# Patient Record
Sex: FEMALE | Race: WHITE | NOT HISPANIC OR LATINO | Employment: UNEMPLOYED | URBAN - METROPOLITAN AREA
[De-identification: names, ages, dates, MRNs, and addresses within clinical notes are randomized per-mention and may not be internally consistent; named-entity substitution may affect disease eponyms.]

---
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
## 2022-02-11 VITALS
SYSTOLIC BLOOD PRESSURE: 114 MMHG | OXYGEN SATURATION: 99 % | TEMPERATURE: 98.5 F | HEART RATE: 68 BPM | BODY MASS INDEX: 26.88 KG/M2 | DIASTOLIC BLOOD PRESSURE: 62 MMHG | WEIGHT: 156.6 LBS

## 2022-02-11 VITALS
HEIGHT: 63 IN | WEIGHT: 129.8 LBS | TEMPERATURE: 98.2 F | DIASTOLIC BLOOD PRESSURE: 60 MMHG | BODY MASS INDEX: 23 KG/M2 | SYSTOLIC BLOOD PRESSURE: 110 MMHG | HEART RATE: 72 BPM

## 2022-02-11 VITALS
HEIGHT: 63 IN | HEART RATE: 68 BPM | DIASTOLIC BLOOD PRESSURE: 72 MMHG | SYSTOLIC BLOOD PRESSURE: 110 MMHG | BODY MASS INDEX: 24.06 KG/M2 | WEIGHT: 135.8 LBS

## 2022-02-11 VITALS
HEIGHT: 64 IN | OXYGEN SATURATION: 97 % | SYSTOLIC BLOOD PRESSURE: 118 MMHG | WEIGHT: 139.2 LBS | HEART RATE: 52 BPM | BODY MASS INDEX: 23.76 KG/M2 | DIASTOLIC BLOOD PRESSURE: 62 MMHG

## 2022-02-15 NOTE — PROGRESS NOTES
Patient:   RODRÍGUEZ BUTLER            MRN: [id number]            FIN: [id number]               Age:   19 years     Sex:  Female     :  2000   Associated Diagnoses:   Well adult exam   Author:   Hernesto Agustin MD      Impression and Plan   Diagnosis     Well adult exam (VKH30-GQ Z00.00).     Course:  Progressing as expected.    Plan   Orders     Orders   Charges (Evaluation and Management):  68871 periodic preventive med est patient 18-39 yrs (Charge) (Order): Quantity: 1, Well adult exam.        Visit Information      Date of Service: 2019 01:17 pm  Performing Location: [facility]  Encounter#: [id number]      Primary Care Provider (PCP):  Hernesto Agustin MD    NPI# [id number]   Visit type:  Annual exam.    Accompanied by:  No one.    Source of history:  Self.    History limitation:  None.       Chief Complaint   Chief complaint discussed and confirmed correct.     2019 1:39 PM CDT    Pt here for well child visit        Well Adult History   Well Adult History             The patient presents for well adult exam.  The patient's general health status is described as good.  The patient's diet is described as balanced.  Exercise: occasional.  Associated symptoms consist of none.  Medical encounters: none.  Compliance problems: none.        Review of Systems   Constitutional:  Negative.    Eye:  Negative.    Ear/Nose/Mouth/Throat:  Negative.    Respiratory:  Negative.    Cardiovascular:  Negative.    Gastrointestinal:  Negative.    Genitourinary:  Negative.    Hematology/Lymphatics:  Negative.    Endocrine:  Negative.    Immunologic:  Negative.    Musculoskeletal:  Negative.    Integumentary:  Negative.    Neurologic:  Negative.    Psychiatric:  Negative.    All other systems reviewed and negative      Health Status   Allergies:    Allergic Reactions (Selected)  Severity Not Documented  Nickel (Rash)   Medications:  (Selected)   Documented Medications  Documented  Nexplanon 68 mg  subcutaneous implant: 1 EA ( 68 mg ), subcutaneous, once, 0 Refill(s), Type: Maintenance   Problem list:    No problem items selected or recorded.      Histories   Past Medical History:    No active or resolved past medical history items have been selected or recorded.   Family History:    No family history items have been selected or recorded.   Procedure history:    Nexplanon in place (SNOMED CT [id number]) performed by Jenna Doyle on 3/22/2017 at 16 Years.  Comments:  3/22/2017 9:08 AM CDT - VolodymyrCassidy barbour CMA  Lot # [id number]  Due for removal 3/22/2020   Social History:             No active social history items have been recorded.      Physical Examination   Vital signs reviewed  and within acceptable limits    Vital Signs   8/12/2019 1:39 PM CDT Peripheral Pulse Rate 52 bpm  LOW    Systolic Blood Pressure 118 mmHg    Diastolic Blood Pressure 62 mmHg    Mean Arterial Pressure 81 mmHg    Oxygen Saturation 97 %      Measurements from flowsheet : Measurements   8/12/2019 1:39 PM CDT Height Measured - Standard 64 in    Weight Measured - Standard 139.2 lb    BSA 1.69 m2    Body Mass Index 23.89 kg/m2    Body Mass Index Percentile 74.87      General:  Alert and oriented, No acute distress.    Eye:  Pupils are equal, round and reactive to light, Extraocular movements are intact, Normal conjunctiva.    HENT:  Normocephalic, Tympanic membranes are clear, Normal hearing, Oral mucosa is moist, No pharyngeal erythema.    Neck:  Supple, Non-tender, No carotid bruit, No jugular venous distention, No lymphadenopathy, No thyromegaly.    Respiratory:  Lungs are clear to auscultation, Respirations are non-labored, Breath sounds are equal, Symmetrical chest wall expansion, No chest wall tenderness.    Cardiovascular:  Normal rate, Regular rhythm, No murmur, No gallop, Good pulses equal in all extremities, Normal peripheral perfusion, No edema.    Breast:  No mass, No tenderness, No discharge.     Gastrointestinal:  Soft, Non-tender, Non-distended, Normal bowel sounds, No organomegaly.    Genitourinary:  No costovertebral angle tenderness, No inguinal tenderness, No urethral discharge, No lesions, External genitalia normal to inspection.  Vagina and cervix appear normal with speculum examination., uterus normal size and mobility without mass or tenderness. Adnexa without mass or tenderness..    Lymphatics:  No lymphadenopathy neck, axilla, groin.    Musculoskeletal:  Normal range of motion, Normal strength, No tenderness, No swelling, No deformity, Normal gait.    Integumentary:  Warm, Dry, Pink.    Neurologic:  Normal sensory, Normal motor function, No focal deficits, Cranial Nerves II-XII are grossly intact, Normal deep tendon reflexes.    Psychiatric:  Cooperative, Appropriate mood & affect, Normal judgment.

## 2022-02-15 NOTE — NURSING NOTE
Comprehensive Intake Entered On:  4/1/2020 10:59 AM CDT    Performed On:  4/1/2020 10:56 AM CDT by Gwen Alvarez LPN               Summary   Chief Complaint :   Neplanon removal, past due date for removal, would like to have a new one placed   Weight Measured :   156.6 lb(Converted to: 156 lb 10 oz, 71.03 kg)    Systolic Blood Pressure :   114 mmHg   Diastolic Blood Pressure :   62 mmHg   Mean Arterial Pressure :   79 mmHg   Peripheral Pulse Rate :   68 bpm   BP Site :   Right arm   BP Method :   Manual   Temperature Tympanic :   98.5 DegF(Converted to: 36.9 DegC)    Oxygen Saturation :   99 %   Gwen Alvarez LPN - 4/1/2020 10:56 AM CDT   Health Status   Allergies Verified? :   Yes   Medication History Verified? :   Yes   Medical History Verified? :   No   Pre-Visit Planning Status :   Completed   Tobacco Use? :   Never smoker   Gwen Alvarez LPN - 4/1/2020 10:56 AM CDT   Meds / Allergies   (As Of: 4/1/2020 10:59:04 AM CDT)   Allergies (Active)   Nickel  Estimated Onset Date:   Unspecified ; Reactions:   rash ; Created By:   Hernesto Agustin MD; Reaction Status:   Active ; Category:   Other ; Substance:   Nickel ; Type:   Allergy ; Updated By:   Hernesto Agustin MD; Reviewed Date:   8/12/2019 1:40 PM CDT      No Known Medication Allergies  Estimated Onset Date:   Unspecified ; Created By:   Gwen Alvarez LPN; Reaction Status:   Active ; Category:   Drug ; Substance:   No Known Medication Allergies ; Type:   Allergy ; Updated By:   Gwen Alvarez LPN; Reviewed Date:   4/1/2020 10:57 AM CDT        Medication List   (As Of: 4/1/2020 10:59:04 AM CDT)   Home Meds    etonogestrel  :   etonogestrel ; Status:   Documented ; Ordered As Mnemonic:   Nexplanon 68 mg subcutaneous implant ; Simple Display Line:   68 mg, 1 EA, subcutaneous, once, 0 Refill(s) ; Catalog Code:   etonogestrel ; Order Dt/Tm:   3/22/2017 9:09:18 AM CDT ; Comment:   Lot # [id number]  Due to be removed 3/22/2020

## 2022-02-15 NOTE — PROGRESS NOTES
Patient:   RODRÍGUEZ BUTLER            MRN: [id number]            FIN: [id number]               Age:   17 years     Sex:  Female     :  2000   Associated Diagnoses:   Sports physical   Author:   Hernesto Agustin MD      Impression and Plan   Diagnosis     Sports physical (NUV98-NE Z02.5).     Course:  Progressing as expected.    Plan:  No restrictions or special recommendations for school athletic participation.    Orders     Orders   Charges (Evaluation and Management):  13931 periodic preventive med est patient 12-17yrs (Charge) (Order): Quantity: 1, Sports physical.        Visit Information      Date of Service: 2017 09:30 am  Performing Location: [facility]  Encounter#: [id number]      Primary Care Provider (PCP):  Hernesto Agustin MD    NPI# [id number]      Referring Provider:  Hernesto Agustin MD# [id number]   Visit type:  Annual exam.    Accompanied by:  No one.    Source of history:  Self.    History limitation:  None.       Chief Complaint   Chief complaint discussed and confirmed correct.     2017 9:41 AM CDT    Pt. here for sports physical.        History of Present Illness   See History form filled out by parent (scanned to EMR)      Review of Systems   Constitutional:  Negative.    Eye:  Negative.    Ear/Nose/Mouth/Throat:  Negative.    Respiratory:  Negative.    Cardiovascular:  Negative.    Gastrointestinal:  Negative.    Genitourinary:  Negative.    Hematology/Lymphatics:  Negative.    Endocrine:  Negative.    Immunologic:  Negative.    Musculoskeletal:  Negative.    Integumentary:  Negative.    Neurologic:  Negative.    Psychiatric:  Negative.           All other systems reviewed and negative      Health Status   Allergies:    Allergic Reactions (Selected)  Severity Not Documented  Nickel (Rash)   Medications:  (Selected)   Documented Medications  Documented  Nexplanon 68 mg subcutaneous implant: 1 EA ( 68 mg ), subcutaneous, once, 0 Refill(s), Type: Maintenance    Problem list:    No problem items selected or recorded.      Histories   Past Medical History:    No active or resolved past medical history items have been selected or recorded.   Family History:    No family history items have been selected or recorded.   Procedure history:    Nexplanon in place (SNOMED CT [id number]) performed by Jenna Doyle on 3/22/2017 at 16 Years.  Comments:  3/22/2017 9:08 AM - Meir Cassidy FLOOD  Lot # [id number]  Due for removal 3/22/2020   Social History:             No active social history items have been recorded.      Physical Examination   Vital signs reviewed  and within acceptable limits    Vital Signs   7/17/2017 9:41 AM CDT Temperature Tympanic 98.2 DegF    Peripheral Pulse Rate 72 bpm    Pulse Site Radial artery    HR Method Manual    Systolic Blood Pressure 110 mmHg    Diastolic Blood Pressure 60 mmHg    Mean Arterial Pressure 77 mmHg    BP Site Right arm    BP Method Manual      Measurements from flowsheet : Measurements   7/17/2017 9:41 AM CDT Height Measured - Standard 63 in    Weight Measured - Standard 129.8 lb    BSA 1.62 m2    Body Mass Index 22.99 kg/m2    Body Mass Index Percentile 75.00      General:  Alert and oriented, No acute distress.    Eye:  Pupils are equal, round and reactive to light, Extraocular movements are intact, Normal conjunctiva.    HENT:  Normocephalic, Tympanic membranes are clear, Normal hearing, Oral mucosa is moist, No pharyngeal erythema.    Neck:  Supple, Non-tender, No carotid bruit, No jugular venous distention, No lymphadenopathy, No thyromegaly.    Respiratory:  Lungs are clear to auscultation, Respirations are non-labored, Breath sounds are equal, Symmetrical chest wall expansion, No chest wall tenderness.    Cardiovascular:  Normal rate, Regular rhythm, No murmur, No gallop, Good pulses equal in all extremities, Normal peripheral perfusion, No edema.    Gastrointestinal:  Soft, Non-tender, Non-distended,  Normal bowel sounds, No organomegaly.    Lymphatics:  No lymphadenopathy neck, axilla, groin.    Musculoskeletal:  Normal range of motion, Normal strength, No tenderness, No swelling, No deformity, Normal gait.    Integumentary:  Warm, Dry, Pink.    Neurologic:  Normal sensory, Normal motor function, No focal deficits, Cranial Nerves II-XII are grossly intact, Normal deep tendon reflexes.    Cognition and Speech:  Oriented, Speech clear and coherent, Functional cognition intact.    Psychiatric:  Cooperative, Appropriate mood & affect, Normal judgment.

## 2022-02-15 NOTE — PROGRESS NOTES
Patient:   RODRÍGUEZ BUTLER            MRN: [id number]            FIN: [id number]               Age:   16 years     Sex:  Female     :  2000   Associated Diagnoses:   Family planning; Nexplanon insertion   Author:   Jenna Doyle      Visit Information   Visit type:  General concerns, Desires information on Nexplanon for contraception.       Chief Complaint   3/22/2017 8:08 AM CDT    Pt here for Nexplanon consult        Interval History   Concerning symptoms as listed in Chief Complaint above discussed and confirmed with patient      Contraception Management   Here  with her mom  to received counseling regarding implantable form of contraception--Nexplanon  She has an older sister who uses it and likes it.   She has never had IC, not planning to initiate soon but mom knows that under [employer] she could get one inserted today and then again in 3 years before insurance is lost.  Menses onset age 14. LMP 2 weeks ago, predictable q month, not too heavy    denies History of blood clots, clotting disorder, cancer, liver disease, stroke, myocardial infarction.  No contraindications to progesterone containing contraception         Health Status   Allergies:    Allergic Reactions (Selected)  Severity Not Documented  Nickel (Rash)   Medications:  (Selected)   Documented Medications  Documented  Nexplanon 68 mg subcutaneous implant: 1 EA ( 68 mg ), subcutaneous, once, 0 Refill(s), Type: Maintenance   Problem list:    No problem items selected or recorded.      Histories   Past Medical History:    No active or resolved past medical history items have been selected or recorded.   Family History:    No family history items have been selected or recorded.   Procedure history:    Nexplanon in place (SNOMED CT [id number]) performed by Jenna Doyle on 3/22/2017 at 16 Years.  Comments:  3/22/2017 9:08 AM - Cassidy Worley CMA  Lot # [id number]  Due for removal 3/22/2020   Social History:              No active social history items have been recorded.,             No active social history items have been recorded.      Physical Examination     Desires to receive implant.  Reviewed use/risks/benefits and bleeding profile. Reviewed risks of insertion and removal, need for possible back up method and need to rule out pregnancy.  Urine Pregnancy test was done and was negative.  LMP:       2 weeks ago          Current method of contraception: abstaining  NO CONTRAINDICATIONS to procedure.     Vital Signs   3/22/2017 8:08 AM CDT Peripheral Pulse Rate 68 bpm    Pulse Site Radial artery    HR Method Manual    Systolic Blood Pressure 110 mmHg    Diastolic Blood Pressure 72 mmHg    Mean Arterial Pressure 85 mmHg    BP Site Right arm    BP Method Manual      Measurements from flowsheet : Measurements   3/22/2017 8:08 AM CDT Height Measured - Standard 62.75 in    Weight Measured - Standard 135.8 lb    BSA 1.65 m2    Body Mass Index 24.25 kg/m2    Body Mass Index Percentile 83.35      General:  Alert and oriented, No acute distress.       Procedure   PROCEDURE NOTE: Patient Consent Form was reviewed with and provided to the patient.   After receiving informed consent the patient was moved to the procedure room where she was moved onto the exam table and the nondominant arm, _____left____ arm, was lifted above her head and the area between the triceps and biceps on the inner aspect of the arm was located and marked in preparation for Nexplanon insertion approximately 8 cm from the medial epicondyle with a second irene a few centimeters proximal to this to serve as a direction guide during insertion.. The area was cleaned with Betadine swabs.  A sterile field was draped over the site. The site was then anesthetized with 1% lidocaine, approximately 2.0 cc. The patient tolerated this well.  The Nexplanon applicator was removed from the sterile packaging and the Nexplanon selene was easily visible.   Countertraction was applied  around the skin and at a 20 degree  angle the Nexplanon was inserted, beveled side up.  The applicator was then lowered to a horizontal position and by tenting the skin I was able to fully insert the needle.  The purple slider was moved back until it stopped. The applicator was removed.  The Nexplanon selene was easily inserted into the skin subdermally.  The patient also was able to palpate the selene in place.  The area was then covered with ointment and Band-Aid and wrapped with a conform dressing.  She tolerated this well. I instructed her on watching for signs of infection.She may apply an ice pack periodically and use ibuprofen or acetaminophen for discomfort if she has no allergies. She may remove the dressing after 24 hours. She was given the user card .   She tolerated the procedure well  She is aware of the potential for altered bleeding pattern and is also instructed on the importance of  Pap smear screenings,  pelvic exams,  and STI testing.        Removal Date: 3 yrs   Switching from another hormonal method? abstaining  Nexplanon successfully Palpated in patients arm by Clinician?           y         By patient?  y      Review / Management   Results review:  Lab results   3/22/2017 8:35 AM CDT hCG Ql POC Negative    POC Test Comments POC Test Comments   , upt negative.       Impression and Plan   Implanon insertion  Plan:  as above   Diagnosis     Family planning (OYX32-YK Z30.09).     Nexplanon insertion (ETY72-TV Z30.017).     Orders   Patient Instructions:       Counseled: Patient, Regarding diagnosis, Regarding treatment, Regarding medications, Verbalized understanding.    Counseled:  Patient, Counseled pt on use/risks/benefits of Nexplanon. Counseled on appropriate insertion timing, need to use back up method x2 weeks if not inserting during week of predicted menses.  Need to continue protection to prevent sexually transmitted infections.  Counseled on risks of infection with insertion and removal, on  risk of migration into the muscles or even the major blood vessels with risk of emboli, encapsulating or breakage of device.  Counseled on when to return to clinic for removal in 3 years, sooner if desires.  Emphasized irregular bleeding pattern possible with Nexplanon.  Reviewed ACHES symptoms that require immediate evaluation. She should return to clinic ASAP if she can not palpate the device. Reviewed  patient labeling information with her and sent copy home with her to review. Questions answered.  Expresses understanding.  Total time spent  in counseling 15 min.    prior to insertion of device    MOm refuses offer of gardisil vaccine after education given

## 2022-02-15 NOTE — NURSING NOTE
Comprehensive Intake Entered On:  8/12/2019 1:43 PM CDT    Performed On:  8/12/2019 1:39 PM CDT by Cassidy Hoyt CMA               Summary   Chief Complaint :   Pt here for well child visit   Weight Measured :   139.2 lb(Converted to: 139 lb 3 oz, 63.14 kg)    Height Measured :   64 in(Converted to: 5 ft 4 in, 162.56 cm)    Body Mass Index :   23.89 kg/m2   Body Surface Area :   1.69 m2   Systolic Blood Pressure :   118 mmHg   Diastolic Blood Pressure :   62 mmHg   Mean Arterial Pressure :   81 mmHg   Peripheral Pulse Rate :   52 bpm (LOW)    Oxygen Saturation :   97 %   Cassidy Hoyt CMA - 8/12/2019 1:39 PM CDT   Health Status   Allergies Verified? :   Yes   Medication History Verified? :   Yes   Medical History Verified? :   Yes   Pre-Visit Planning Status :   Completed   Tobacco Use? :   Never smoker   Cassidy Hoyt CMA - 8/12/2019 1:39 PM CDT   Consents   Consent for Immunization Exchange :   Consent Granted   Consent for Immunizations to Providers :   Consent Granted   Cassidy Hoyt CMA - 8/12/2019 1:39 PM CDT   Meds / Allergies   (As Of: 8/12/2019 1:43:01 PM CDT)   Allergies (Active)   Nickel  Estimated Onset Date:   Unspecified ; Reactions:   rash ; Created By:   Hernesto Agustin MD; Reaction Status:   Active ; Category:   Other ; Substance:   Nickel ; Type:   Allergy ; Updated By:   Hernesto Agustin MD; Reviewed Date:   8/12/2019 1:40 PM CDT        Medication List   (As Of: 8/12/2019 1:43:01 PM CDT)   Prescription/Discharge Order    oseltamivir  :   oseltamivir ; Status:   Processing ; Ordered As Mnemonic:   Tamiflu 75 mg oral capsule ; Ordering Provider:   Hernesto Agustin MD; Action Display:   Complete ; Catalog Code:   oseltamivir ; Order Dt/Tm:   8/12/2019 1:40:18 PM            Home Meds    etonogestrel  :   etonogestrel ; Status:   Documented ; Ordered As Mnemonic:   Nexplanon 68 mg subcutaneous implant ; Simple Display Line:   68 mg, 1 EA, subcutaneous, once, 0 Refill(s) ; Catalog Code:    etonogestrel ; Order Dt/Tm:   3/22/2017 9:09:18 AM ; Comment:   Lot # [id number]  Due to be removed 3/22/2020

## 2022-02-15 NOTE — PROGRESS NOTES
Patient:   RODRÍGUEZ BUTLER            MRN: [id number]            FIN: [id number]               Age:   19 years     Sex:  Female     :  2000   Associated Diagnoses:   Nexplanon insertion; Nexplanon removal   Author:   Jenna Doyle      Visit Information      Date of Service: 2020 10:00 am  Performing Location: [facility]  Encounter#: [id number]      Primary Care Provider (PCP):  Hernesto Agustin MD    NPI# [id number]      Procedure   Procedure   Date/ Time:  2020 1:42:00 PM.     Confirmed: patient, procedure, side, site, safety procedures followed.     Performed by: Jenna Doyle.     Type of procedure: Implanon /Nexplanon removal.     Physical Exam: no relative contraindications found, vital signs Vital Signs   2020 10:56 AM CDT Temperature Tympanic 98.5 DegF    Peripheral Pulse Rate 68 bpm    Systolic Blood Pressure 114 mmHg    Diastolic Blood Pressure 62 mmHg    Mean Arterial Pressure 79 mmHg    BP Site Right arm    BP Method Manual    Oxygen Saturation 99 %      .     Informed consent: signed by patient.     Indication: Pt presents to have Implanon/Nexplanon  implantable birth control device removed.  Reason for Removal:  over due for removal, she has not been sexually active for 'along time'.  Would like new Nexplanon placed  Had rare bleeding with Nexplanon, very pleased with it  Patient  about removal risks. No attempt to remove device will be made if the device is not palpable.  Confirmed no allergies to antiseptic and anesthetic.  Informed consent was given and procedure consent signed and witnessed. Pt was moved to the procedure room and place on cot supine with _______left______ arm raised and resting above head.  Implanon/Nexplanon device was easily palpable.   The end closest to the elbow was marked with a sterile marker.  The surrounding area was cleaned with Betadine swabs.  The area underneath the end of the implant closest to the elbow was injected  with 0.5cc or 1%Lidocaine with epi, being careful to inject under the implant to prevent obscuring or displacing the selene.  By pressing down on the end of the implant closest to the axilla, the tip closest to the elbow was visualized and a 2-3 mm incision in the longitudinal direction of the arm was made here, working toward the elbow..  By Gently pushing the implant toward the incision the tip was visible and then grasped with a sterile mosquito forceps and gently removed.  The entire selene, was removed, measured to confirm it's 4 cm in length, and discarded.    Using sterile technique, a new Nexplanon device was inserted into the same incision, easily palpable by pt an dmy self  after procedure      A butterfly steri strip 1/4 inch wide was used to close the incision and a bandaid and pressure dressing were applied with 4x4 dressings and Coban.  The patient was instructed to keep the dressing in place for 24 hours then she may remove it and keep clean and dry and covered with a bandaid if needed.  The patient was instructed to watch for s/s of infection and to RTC if she has any concerns.    use back up method for 10 days if she initiates IC.     Procedure tolerated: well.     Specimen: disposed of, Implanon device discarded.        Impression and Plan   Diagnosis     Nexplanon insertion (FOP59-XJ Z30.017).     Nexplanon removal (XRD32-QG Z30.46).     Counseled:  Patient, Regarding diagnosis, Regarding treatment, as above.

## 2023-03-20 ENCOUNTER — OFFICE VISIT (OUTPATIENT)
Dept: FAMILY MEDICINE | Facility: CLINIC | Age: 23
End: 2023-03-20
Payer: COMMERCIAL

## 2023-03-20 VITALS
TEMPERATURE: 98.8 F | DIASTOLIC BLOOD PRESSURE: 66 MMHG | SYSTOLIC BLOOD PRESSURE: 120 MMHG | BODY MASS INDEX: 28 KG/M2 | WEIGHT: 164 LBS | HEIGHT: 64 IN | OXYGEN SATURATION: 97 % | HEART RATE: 80 BPM

## 2023-03-20 DIAGNOSIS — Z30.46 SURVEILLANCE OF PREVIOUSLY PRESCRIBED IMPLANTABLE SUBDERMAL CONTRACEPTIVE: Primary | ICD-10-CM

## 2023-03-20 PROCEDURE — 11983 REMOVE/INSERT DRUG IMPLANT: CPT | Performed by: NURSE PRACTITIONER

## 2023-03-20 NOTE — PROGRESS NOTES
"NEXPLANON REMOVAL/REINSERTION:    Is a pregnancy test required: No.  Was a consent obtained?  Yes    Subjective: Claudia Wilkins is a 22 year old No obstetric history on file. presents for Nexplanon.  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  She has had 2 other Nexplanon's and is very happy with them, she has minimal spotting.  She has never had a Pap smear and she is agreeable to scheduling that but will probably do it in [place] because she will not be coming back to [place] as her teaching assignment is changed.  Denies any symptoms of STI    Patient has been given the opportunity to ask questions about all forms of birth control, including all options appropriate for Claudia Wilkins. Discussed that no method of birth control, except abstinence is 100% effective against pregnancy or sexually transmitted infection.     Claudia Wilkins understands planning removal and reinsertion today    The entire removal and insertion procedure was reviewed with the patient, including care after placement.      /66 (BP Location: Right arm, Patient Position: Sitting)   Pulse 80   Temp 98.8  F (37.1  C)   Ht 1.613 m (5' 3.5\")   Wt 74.4 kg (164 lb)   SpO2 97%   BMI 28.60 kg/m      PROCEDURE NOTE: -- Nexplanon Removal/Insertion    Technique: On the left arm  Skin prep Betadine  Anesthesia 1% lidocaine, with epi  Procedure: Patient was placed supine with left arm exposed.  Implant located by palpitation. Irene was made 8-10 cm above medial epicondyle and a guiding irene 4 cm above the first.  Arm was prepped with Betadine. Incision point was anesthetized with 1 mL 1% lidocaine.     Small incision (<5mm) was made at distal end of palpable implant, curved hemostat or mosquito forceps was used to isolate the implant and bring it to the incision, the fibrous capsule containing the implant  was incised and the " implant was removed intact.    After stretching the skin with thumb and index finger around the insertion site, skin punctured with the tip of the needle inserted at 30 degrees and then lowered to horizontal position. While lifting the skin with the tip of the needle, inserted the needle to its full length. Applicator was then stabilized and the slider was unlocked by pushing it slightly down. Slider moved back completely until it stopped. Applicator was then removed.    Correct placement of the implant was confirmed by palpation in the patient's arm and visualizing the purple top of the obturator.   Bandage and pressure dressing applied to insertion site.    ASSESSMENT: No diagnosis found.     PLAN:    Given 's handouts, including when to have Nexplanon removed, list of danger s/sx, side effects and follow up recommended. Encouraged condom use for prevention of STD. Back up contraception advised for 7 days. Advised to call for any fever, for prolonged or severe pain or bleeding, abnormal vaginal dischage. She was advised to use pain medications (ibuprofen) as needed for mild to moderate pain.     Jenna Gonzalez NP

## 2023-05-30 ENCOUNTER — OFFICE VISIT (OUTPATIENT)
Dept: FAMILY MEDICINE | Facility: CLINIC | Age: 23
End: 2023-05-30
Payer: COMMERCIAL

## 2023-05-30 VITALS
OXYGEN SATURATION: 97 % | BODY MASS INDEX: 28.07 KG/M2 | HEIGHT: 64 IN | TEMPERATURE: 98 F | WEIGHT: 164.4 LBS | SYSTOLIC BLOOD PRESSURE: 106 MMHG | DIASTOLIC BLOOD PRESSURE: 64 MMHG | HEART RATE: 65 BPM

## 2023-05-30 DIAGNOSIS — Z12.4 CERVICAL CANCER SCREENING: ICD-10-CM

## 2023-05-30 DIAGNOSIS — Z11.3 SCREENING FOR STDS (SEXUALLY TRANSMITTED DISEASES): ICD-10-CM

## 2023-05-30 DIAGNOSIS — Z00.00 ROUTINE GENERAL MEDICAL EXAMINATION AT A HEALTH CARE FACILITY: Primary | ICD-10-CM

## 2023-05-30 PROCEDURE — 90651 9VHPV VACCINE 2/3 DOSE IM: CPT | Performed by: NURSE PRACTITIONER

## 2023-05-30 PROCEDURE — 87491 CHLMYD TRACH DNA AMP PROBE: CPT | Performed by: NURSE PRACTITIONER

## 2023-05-30 PROCEDURE — 99395 PREV VISIT EST AGE 18-39: CPT | Mod: 25 | Performed by: NURSE PRACTITIONER

## 2023-05-30 PROCEDURE — 87591 N.GONORRHOEAE DNA AMP PROB: CPT | Performed by: NURSE PRACTITIONER

## 2023-05-30 PROCEDURE — 90471 IMMUNIZATION ADMIN: CPT | Mod: SL | Performed by: NURSE PRACTITIONER

## 2023-05-30 PROCEDURE — G0145 SCR C/V CYTO,THINLAYER,RESCR: HCPCS | Performed by: NURSE PRACTITIONER

## 2023-05-30 PROCEDURE — 90715 TDAP VACCINE 7 YRS/> IM: CPT | Performed by: NURSE PRACTITIONER

## 2023-05-30 PROCEDURE — 90472 IMMUNIZATION ADMIN EACH ADD: CPT | Mod: SL | Performed by: NURSE PRACTITIONER

## 2023-05-30 ASSESSMENT — ENCOUNTER SYMPTOMS
PALPITATIONS: 0
PARESTHESIAS: 0
NERVOUS/ANXIOUS: 0
HEMATURIA: 0
HEARTBURN: 0
DIZZINESS: 0
FEVER: 0
HEMATOCHEZIA: 0
HEADACHES: 0
SHORTNESS OF BREATH: 0
FREQUENCY: 0
ABDOMINAL PAIN: 0
DYSURIA: 0
EYE PAIN: 0
SORE THROAT: 0
CHILLS: 0
ARTHRALGIAS: 0
WEAKNESS: 0
JOINT SWELLING: 0
MYALGIAS: 0
BREAST MASS: 0
NAUSEA: 0
COUGH: 0
CONSTIPATION: 0
DIARRHEA: 0

## 2023-05-30 NOTE — PROGRESS NOTES
"   SUBJECTIVE:   CC: Claudia is an 23 year old who presents for preventive health visit.     Patient is due for her first pap. 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  Nexplanon re inserted in March 5/30/2023     7:06 AM   Additional Questions   Roomed by viky arrington   Accompanied by self   Patient has been advised of split billing requirements and indicates understanding: Yes     Healthy Habits:    Getting at least 3 servings of Calcium per day:  Yes    Bi-annual eye exam:  Yes    Dental care twice a year:  Yes    Sleep apnea or symptoms of sleep apnea:  None    Diet:  Regular (no restrictions)    Frequency of exercise:  2-3 days/week    Duration of exercise:  15-30 minutes    Taking medications regularly:  Yes    Medication side effects:  Not applicable    PHQ-2 Total Score:    Additional concerns today:  No        Have you ever done Advance Care Planning? (For example, a Health Directive, POLST, or a discussion with a medical provider or your loved ones about your wishes): No, advance care planning information given to patient to review.  Advanced care planning was discussed at today's visit.    Social History     Tobacco Use     Smoking status: Never     Smokeless tobacco: Never   Vaping Use     Vaping status: Never Used   Substance Use Topics     Alcohol use: Yes     Comment: \"a little\"             5/30/2023     6:54 AM   Alcohol Use   Prescreen: >3 drinks/day or >7 drinks/week? No   Reviewed orders with patient.  Reviewed health maintenance and updated orders accordingly - Yes  Lab work is in process    Breast Cancer Screening: routine--pat aunt with breast cancer likely pre menopausal        History of abnormal Pap smear: NO - age 21-29 PAP every 3 years recommended--this is first pap     Reviewed and updated as needed this visit by clinical staff   Tobacco  Allergies  Meds              Reviewed and updated as needed this visit by Provider               " "      Review of Systems   Constitutional: Negative for chills and fever.   HENT: Negative for congestion, ear pain, hearing loss and sore throat.    Eyes: Negative for pain and visual disturbance.   Respiratory: Negative for cough and shortness of breath.    Cardiovascular: Negative for chest pain, palpitations and peripheral edema.   Gastrointestinal: Negative for abdominal pain, constipation, diarrhea, heartburn, hematochezia and nausea.   Breasts:  Negative for tenderness, breast mass and discharge.   Genitourinary: Positive for vaginal discharge. Negative for dysuria, frequency, genital sores, hematuria, pelvic pain, urgency and vaginal bleeding.   Musculoskeletal: Negative for arthralgias, joint swelling and myalgias.   Skin: Negative for rash.   Neurological: Negative for dizziness, weakness, headaches and paresthesias.   Psychiatric/Behavioral: Negative for mood changes. The patient is not nervous/anxious.           OBJECTIVE:   /64 (BP Location: Right arm, Patient Position: Sitting)   Pulse 65   Temp 98  F (36.7  C)   Ht 1.626 m (5' 4\")   Wt 74.6 kg (164 lb 6.4 oz)   LMP  (LMP Unknown)   SpO2 97%   BMI 28.22 kg/m    Physical Exam  GENERAL: healthy, alert and no distress  EYES: Eyes grossly normal to inspection, PERRL and conjunctivae and sclerae normal  HENT: ear canals and TM's normal, nose and mouth without ulcers or lesions  NECK: no adenopathy, no asymmetry, masses, or scars and thyroid normal to palpation  RESP: lungs clear to auscultation - no rales, rhonchi or wheezes  BREAST: normal without masses, tenderness or nipple discharge and no palpable axillary masses or adenopathy  CV: regular rate and rhythm, normal S1 S2, no S3 or S4, no murmur, click or rub, no peripheral edema and peripheral pulses strong  ABDOMEN: soft, nontender, no hepatosplenomegaly, no masses and bowel sounds normal   (female): normal female external genitalia, normal urethral meatus, vaginal mucosa pink, moist, well " "rugated, and normal cervix/adnexa/uterus without masses or discharge  MS: no gross musculoskeletal defects noted, no edema  SKIN: no suspicious lesions or rashes  NEURO: Normal strength and tone, mentation intact and speech normal  PSYCH: mentation appears normal, affect normal/bright  LYMPH: no cervical, supraclavicular, axillary, or inguinal adenopathy        ASSESSMENT/PLAN:       ICD-10-CM    1. Routine general medical examination at a health care facility  Z00.00       2. Screening for STDs (sexually transmitted diseases)  Z11.3 NEISSERIA GONORRHOEA PCR     CHLAMYDIA TRACHOMATIS PCR      3. Cervical cancer screening  Z12.4 Pap Screen only - recommended age 21 - 24 years                COUNSELING:  Reviewed preventive health counseling, as reflected in patient instructions       Regular exercise       Healthy diet/nutrition       Vision screening       Contraception       Safe sex practices/STD prevention       Consider Hep C screening for all patients one time for ages 18-79 years       HIV screeninx in teen years, 1x in adult years, and at intervals if high risk      BMI:   Estimated body mass index is 28.22 kg/m  as calculated from the following:    Height as of this encounter: 1.626 m (5' 4\").    Weight as of this encounter: 74.6 kg (164 lb 6.4 oz).         She reports that she has never smoked. She has never used smokeless tobacco.      Jenna Gonzalez NP  [facility]  "

## 2023-05-30 NOTE — LETTER
May 31, 2023      Claudia Wilkins  [address]  [facility] [address]        Dear ,    We are writing to inform you of your test results.    These labs are normal. Pap results are pending.    Resulted Orders   NEISSERIA GONORRHOEA PCR   Result Value Ref Range    Neisseria gonorrhoeae Negative Negative      Comment:      Negative for N. gonorrhoeae rRNA by transcription mediated amplification. A negative result by transcription mediated amplification does not preclude the presence of C. trachomatis infection because results are dependent on proper and adequate collection, absence of inhibitors and sufficient rRNA to be detected.   CHLAMYDIA TRACHOMATIS PCR   Result Value Ref Range    Chlamydia trachomatis Negative Negative      Comment:      A negative result by transcription mediated amplification does not preclude the presence of C. trachomatis infection because results are dependent on proper and adequate collection, absence of inhibitors and sufficient rRNA to be detected.       If you have any questions or concerns, please call the clinic at the number listed above.       Sincerely,      Jenna Gonzalez NP

## 2023-05-31 LAB
C TRACH DNA SPEC QL NAA+PROBE: NEGATIVE
N GONORRHOEA DNA SPEC QL NAA+PROBE: NEGATIVE

## 2023-06-01 LAB
BKR LAB AP GYN ADEQUACY: NORMAL
BKR LAB AP GYN INTERPRETATION: NORMAL
BKR LAB AP HPV REFLEX: NO
BKR LAB AP PREVIOUS ABNORMAL: NORMAL
PATH REPORT.COMMENTS IMP SPEC: NORMAL
PATH REPORT.COMMENTS IMP SPEC: NORMAL
PATH REPORT.RELEVANT HX SPEC: NORMAL

## 2025-01-20 ENCOUNTER — OFFICE VISIT (OUTPATIENT)
Dept: FAMILY MEDICINE | Facility: CLINIC | Age: 25
End: 2025-01-20
Payer: COMMERCIAL

## 2025-01-20 ENCOUNTER — PATIENT OUTREACH (OUTPATIENT)
Dept: ONCOLOGY | Facility: CLINIC | Age: 25
End: 2025-01-20

## 2025-01-20 VITALS
RESPIRATION RATE: 16 BRPM | HEIGHT: 64 IN | TEMPERATURE: 98.8 F | BODY MASS INDEX: 26 KG/M2 | SYSTOLIC BLOOD PRESSURE: 110 MMHG | DIASTOLIC BLOOD PRESSURE: 68 MMHG | WEIGHT: 152.3 LBS | HEART RATE: 80 BPM | OXYGEN SATURATION: 98 %

## 2025-01-20 DIAGNOSIS — Z00.00 ROUTINE GENERAL MEDICAL EXAMINATION AT A HEALTH CARE FACILITY: ICD-10-CM

## 2025-01-20 DIAGNOSIS — Z11.59 NEED FOR HEPATITIS C SCREENING TEST: ICD-10-CM

## 2025-01-20 DIAGNOSIS — Z11.3 SCREENING FOR STDS (SEXUALLY TRANSMITTED DISEASES): Primary | ICD-10-CM

## 2025-01-20 DIAGNOSIS — R59.1 LYMPHADENOPATHY: ICD-10-CM

## 2025-01-20 DIAGNOSIS — Z80.3 FAMILY HISTORY OF MALIGNANT NEOPLASM OF BREAST: ICD-10-CM

## 2025-01-20 DIAGNOSIS — Z23 NEED FOR VACCINATION: ICD-10-CM

## 2025-01-20 DIAGNOSIS — Z11.4 SCREENING FOR HIV (HUMAN IMMUNODEFICIENCY VIRUS): ICD-10-CM

## 2025-01-20 DIAGNOSIS — Z80.0 FAMILY HISTORY OF COLON CANCER: ICD-10-CM

## 2025-01-20 LAB
BASOPHILS # BLD AUTO: 0 10E3/UL (ref 0–0.2)
BASOPHILS NFR BLD AUTO: 1 %
CLUE CELLS: ABNORMAL
EOSINOPHIL # BLD AUTO: 0.2 10E3/UL (ref 0–0.7)
EOSINOPHIL NFR BLD AUTO: 3 %
ERYTHROCYTE [DISTWIDTH] IN BLOOD BY AUTOMATED COUNT: 11.7 % (ref 10–15)
HCT VFR BLD AUTO: 41.8 % (ref 35–47)
HGB BLD-MCNC: 14.4 G/DL (ref 11.7–15.7)
IMM GRANULOCYTES # BLD: 0 10E3/UL
IMM GRANULOCYTES NFR BLD: 0 %
LYMPHOCYTES # BLD AUTO: 1.7 10E3/UL (ref 0.8–5.3)
LYMPHOCYTES NFR BLD AUTO: 28 %
MCH RBC QN AUTO: 31.2 PG (ref 26.5–33)
MCHC RBC AUTO-ENTMCNC: 34.4 G/DL (ref 31.5–36.5)
MCV RBC AUTO: 91 FL (ref 78–100)
MONOCYTES # BLD AUTO: 0.4 10E3/UL (ref 0–1.3)
MONOCYTES NFR BLD AUTO: 6 %
NEUTROPHILS # BLD AUTO: 3.8 10E3/UL (ref 1.6–8.3)
NEUTROPHILS NFR BLD AUTO: 63 %
PLATELET # BLD AUTO: 249 10E3/UL (ref 150–450)
RBC # BLD AUTO: 4.62 10E6/UL (ref 3.8–5.2)
TRICHOMONAS, WET PREP: ABNORMAL
WBC # BLD AUTO: 6 10E3/UL (ref 4–11)
WBC'S/HIGH POWER FIELD, WET PREP: ABNORMAL
YEAST, WET PREP: PRESENT

## 2025-01-20 PROCEDURE — 36415 COLL VENOUS BLD VENIPUNCTURE: CPT | Performed by: NURSE PRACTITIONER

## 2025-01-20 PROCEDURE — 91320 SARSCV2 VAC 30MCG TRS-SUC IM: CPT | Performed by: NURSE PRACTITIONER

## 2025-01-20 PROCEDURE — 86803 HEPATITIS C AB TEST: CPT | Performed by: NURSE PRACTITIONER

## 2025-01-20 PROCEDURE — 87389 HIV-1 AG W/HIV-1&-2 AB AG IA: CPT | Performed by: NURSE PRACTITIONER

## 2025-01-20 PROCEDURE — 87491 CHLMYD TRACH DNA AMP PROBE: CPT | Performed by: NURSE PRACTITIONER

## 2025-01-20 PROCEDURE — 87591 N.GONORRHOEAE DNA AMP PROB: CPT | Performed by: NURSE PRACTITIONER

## 2025-01-20 PROCEDURE — 90656 IIV3 VACC NO PRSV 0.5 ML IM: CPT | Performed by: NURSE PRACTITIONER

## 2025-01-20 PROCEDURE — 99213 OFFICE O/P EST LOW 20 MIN: CPT | Mod: 25 | Performed by: NURSE PRACTITIONER

## 2025-01-20 PROCEDURE — 90472 IMMUNIZATION ADMIN EACH ADD: CPT | Mod: SL | Performed by: NURSE PRACTITIONER

## 2025-01-20 PROCEDURE — 99395 PREV VISIT EST AGE 18-39: CPT | Mod: 25 | Performed by: NURSE PRACTITIONER

## 2025-01-20 PROCEDURE — 85025 COMPLETE CBC W/AUTO DIFF WBC: CPT | Mod: QW | Performed by: NURSE PRACTITIONER

## 2025-01-20 PROCEDURE — 90651 9VHPV VACCINE 2/3 DOSE IM: CPT | Performed by: NURSE PRACTITIONER

## 2025-01-20 PROCEDURE — 87210 SMEAR WET MOUNT SALINE/INK: CPT | Mod: QW | Performed by: NURSE PRACTITIONER

## 2025-01-20 PROCEDURE — 90480 ADMN SARSCOV2 VAC 1/ONLY CMP: CPT | Performed by: NURSE PRACTITIONER

## 2025-01-20 PROCEDURE — 90471 IMMUNIZATION ADMIN: CPT | Mod: SL | Performed by: NURSE PRACTITIONER

## 2025-01-20 SDOH — HEALTH STABILITY: PHYSICAL HEALTH: ON AVERAGE, HOW MANY MINUTES DO YOU ENGAGE IN EXERCISE AT THIS LEVEL?: 60 MIN

## 2025-01-20 SDOH — HEALTH STABILITY: PHYSICAL HEALTH: ON AVERAGE, HOW MANY DAYS PER WEEK DO YOU ENGAGE IN MODERATE TO STRENUOUS EXERCISE (LIKE A BRISK WALK)?: 6 DAYS

## 2025-01-20 ASSESSMENT — PATIENT HEALTH QUESTIONNAIRE - PHQ9
SUM OF ALL RESPONSES TO PHQ QUESTIONS 1-9: 19
SUM OF ALL RESPONSES TO PHQ QUESTIONS 1-9: 19
10. IF YOU CHECKED OFF ANY PROBLEMS, HOW DIFFICULT HAVE THESE PROBLEMS MADE IT FOR YOU TO DO YOUR WORK, TAKE CARE OF THINGS AT HOME, OR GET ALONG WITH OTHER PEOPLE: SOMEWHAT DIFFICULT

## 2025-01-20 ASSESSMENT — SOCIAL DETERMINANTS OF HEALTH (SDOH): HOW OFTEN DO YOU GET TOGETHER WITH FRIENDS OR RELATIVES?: MORE THAN THREE TIMES A WEEK

## 2025-01-20 NOTE — LETTER
January 21, 2025      Claudia Wilkins  [address]  [facility] [address]        Dear ,    We are writing to inform you of your test results.    Lab results are normal/negative. Will wait for the ultrasound results.    Resulted Orders   HIV Antigen Antibody Combo   Result Value Ref Range    HIV Antigen Antibody Combo Nonreactive Nonreactive      Comment:      Negative HIV-1 p24 antigen and HIV-1/2 antibody screening test results usually indicate the absence of HIV-1 and HIV-2 infection. However, such negative results do not rule-out acute HIV infection.  If acute HIV-1 or HIV-2 infection is suspected, detection of HIV-1 or HIV-2 RNA  is recommended. This result is obtained using the Roche Elecsys HIV Duo method on the jeremi [id number] immunoassay analyzer.   Hepatitis C Screen Reflex to HCV RNA Quant and Genotype   Result Value Ref Range    Hepatitis C Antibody Nonreactive Nonreactive      Comment:      A nonreactive screening test result does not exclude the possibility of exposure to or infection with HCV. Nonreactive screening test results in individuals with prior exposure to HCV may be due to antibody levels below the limit of detection of this assay or lack of reactivity to the HCV antigens used in this assay. Patients with recent HCV infections (<3 months from time of exposure) may have false-negative HCV antibody results due to the time needed for seroconversion (average of 8 to 9 weeks).   CBC with platelets and differential   Result Value Ref Range    WBC Count 6.0 4.0 - 11.0 10e3/uL    RBC Count 4.62 3.80 - 5.20 10e6/uL    Hemoglobin 14.4 11.7 - 15.7 g/dL    Hematocrit 41.8 35.0 - 47.0 %    MCV 91 78 - 100 fL    MCH 31.2 26.5 - 33.0 pg    MCHC 34.4 31.5 - 36.5 g/dL    RDW 11.7 10.0 - 15.0 %    Platelet Count 249 150 - 450 10e3/uL    % Neutrophils 63 %    % Lymphocytes 28 %    % Monocytes 6 %    % Eosinophils 3 %    % Basophils 1 %    % Immature Granulocytes 0 %    Absolute Neutrophils 3.8 1.6 -  8.3 10e3/uL    Absolute Lymphocytes 1.7 0.8 - 5.3 10e3/uL    Absolute Monocytes 0.4 0.0 - 1.3 10e3/uL    Absolute Eosinophils 0.2 0.0 - 0.7 10e3/uL    Absolute Basophils 0.0 0.0 - 0.2 10e3/uL    Absolute Immature Granulocytes 0.0 <=0.4 10e3/uL   Wet preparation   Result Value Ref Range    Trichomonas Absent Absent    Yeast Present (A) Absent    Clue Cells Absent Absent    WBCs/high power field 3+ (A) None   Chlamydia trachomatis/Neisseria gonorrhoeae by PCR- VAGINAL SELF-SWAB   Result Value Ref Range    Chlamydia Trachomatis Negative Negative      Comment:      Negative for C. trachomatis rRNA by transcription mediated amplification.   A negative result by transcription mediated amplification does not preclude the presence of infection because results are dependent on proper and adequate collection, absence of inhibitors and sufficient rRNA to be detected.    Neisseria gonorrhoeae Negative Negative      Comment:      Negative for N. gonorrhoeae rRNA by transcription mediated amplification. A negative result by transcription mediated amplification does not preclude the presence of C. trachomatis infection because results are dependent on proper and adequate collection, absence of inhibitors and sufficient rRNA to be detected.    CTNG Specimen Source Vagina        If you have any questions or concerns, please call the clinic at the number listed above.       Sincerely,      Jenna Gonzalez NP    Electronically signed

## 2025-01-20 NOTE — PROGRESS NOTES
New Patient Oncology Nurse Navigator Note     Referring provider:     Jenna Gonzalez NP        Referring Clinic/Organization:     [facility]        Referred to (specialty:) Genetic Counseling and Cancer Risk Management     Requested provider (if applicable): NA     Date Referral Received: January 20, 2025     Evaluation for:  Strong Family History of Cancer     Z80.3 (ICD-10-CM) - Family history of malignant neoplasm of breast   Z80.0 (ICD-10-CM) - Family history of colon cancer     Payor: BCBS / Plan: [employer]BS [place] PMAP / Product Type: PPO /     January 20, 2025    Referral received and reviewed.   Sent to NPS to schedule.     Farzaneh PEDRON, RN, OCN  Oncology Nurse Navigator   [facility]  Cancer Care Service Line   New Patient Hem/Onc Scheduling / Referrals: [phone] (fax: [phone] )

## 2025-01-20 NOTE — PATIENT INSTRUCTIONS
Patient Education   Preventive Care Advice   This is general advice given by our system to help you stay healthy. However, your care team may have specific advice just for you. Please talk to your care team about your preventive care needs.  Nutrition  Eat 5 or more servings of fruits and vegetables each day.  Try wheat bread, brown rice and whole grain pasta (instead of white bread, rice, and pasta).  Get enough calcium and vitamin D. Check the label on foods and aim for 100% of the RDA (recommended daily allowance).  Lifestyle  Exercise at least 150 minutes each week  (30 minutes a day, 5 days a week).  Do muscle strengthening activities 2 days a week. These help control your weight and prevent disease.  No smoking.  Wear sunscreen to prevent skin cancer.  Have a dental exam and cleaning every 6 months.  Yearly exams  See your health care team every year to talk about:  Any changes in your health.  Any medicines your care team has prescribed.  Preventive care, family planning, and ways to prevent chronic diseases.  Shots (vaccines)   HPV shots (up to age 26), if you've never had them before.  Hepatitis B shots (up to age 59), if you've never had them before.  COVID-19 shot: Get this shot when it's due.  Flu shot: Get a flu shot every year.  Tetanus shot: Get a tetanus shot every 10 years.  Pneumococcal, hepatitis A, and RSV shots: Ask your care team if you need these based on your risk.  Shingles shot (for age 50 and up)  General health tests  Diabetes screening:  Starting at age 35, Get screened for diabetes at least every 3 years.  If you are younger than age 35, ask your care team if you should be screened for diabetes.  Cholesterol test: At age 39, start having a cholesterol test every 5 years, or more often if advised.  Bone density scan (DEXA): At age 50, ask your care team if you should have this scan for osteoporosis (brittle bones).  Hepatitis C: Get tested at least once in your life.  STIs (sexually  transmitted infections)  Before age 24: Ask your care team if you should be screened for STIs.  After age 24: Get screened for STIs if you're at risk. You are at risk for STIs (including HIV) if:  You are sexually active with more than one person.  You don't use condoms every time.  You or a partner was diagnosed with a sexually transmitted infection.  If you are at risk for HIV, ask about PrEP medicine to prevent HIV.  Get tested for HIV at least once in your life, whether you are at risk for HIV or not.  Cancer screening tests  Cervical cancer screening: If you have a cervix, begin getting regular cervical cancer screening tests starting at age 21.  Breast cancer scan (mammogram): If you've ever had breasts, begin having regular mammograms starting at age 40. This is a scan to check for breast cancer.  Colon cancer screening: It is important to start screening for colon cancer at age 45.  Have a colonoscopy test every 10 years (or more often if you're at risk) Or, ask your provider about stool tests like a FIT test every year or Cologuard test every 3 years.  To learn more about your testing options, visit:   .  For help making a decision, visit:   https://bit.ly/dr03206.  Prostate cancer screening test: If you have a prostate, ask your care team if a prostate cancer screening test (PSA) at age 55 is right for you.  Lung cancer screening: If you are a current or former smoker ages 50 to 80, ask your care team if ongoing lung cancer screenings are right for you.  For informational purposes only. Not to replace the advice of your health care provider. Copyright   2023 [facility]. All rights reserved. Clinically reviewed by the [facility]. Sol Mar REI [id number] - REV 01/24.  Learning About Stress  What is stress?     Stress is your body's response to a hard situation. Your body can have a physical, emotional, or mental response. Stress is a fact of life for most people, and it  affects everyone differently. What causes stress for you may not be stressful for someone else.  A lot of things can cause stress. You may feel stress when you go on a job interview, take a test, or run a race. This kind of short-term stress is normal and even useful. It can help you if you need to work hard or react quickly. For example, stress can help you finish an important job on time.  Long-term stress is caused by ongoing stressful situations or events. Examples of long-term stress include long-term health problems, ongoing problems at work, or conflicts in your family. Long-term stress can harm your health.  How does stress affect your health?  When you are stressed, your body responds as though you are in danger. It makes hormones that speed up your heart, make you breathe faster, and give you a burst of energy. This is called the fight-or-flight stress response. If the stress is over quickly, your body goes back to normal and no harm is done.  But if stress happens too often or lasts too long, it can have bad effects. Long-term stress can make you more likely to get sick, and it can make symptoms of some diseases worse. If you tense up when you are stressed, you may develop neck, shoulder, or low back pain. Stress is linked to high blood pressure and heart disease.  Stress also harms your emotional health. It can make you jewell, tense, or depressed. Your relationships may suffer, and you may not do well at work or school.  What can you do to manage stress?  You can try these things to help manage stress:   Do something active. Exercise or activity can help reduce stress. Walking is a great way to get started. Even everyday activities such as housecleaning or yard work can help.  Try yoga or sukhdev chi. These techniques combine exercise and meditation. You may need some training at first to learn them.  Do something you enjoy. For example, listen to music or go to a movie. Practice your hobby or do volunteer  "work.  Meditate. This can help you relax, because you are not worrying about what happened before or what may happen in the future.  Do guided imagery. Imagine yourself in any setting that helps you feel calm. You can use online videos, books, or a teacher to guide you.  Do breathing exercises. For example:  From a standing position, bend forward from the waist with your knees slightly bent. Let your arms dangle close to the floor.  Breathe in slowly and deeply as you return to a standing position. Roll up slowly and lift your head last.  Hold your breath for just a few seconds in the standing position.  Breathe out slowly and bend forward from the waist.  Let your feelings out. Talk, laugh, cry, and express anger when you need to. Talking with supportive friends or family, a counselor, or a jacob leader about your feelings is a healthy way to relieve stress. Avoid discussing your feelings with people who make you feel worse.  Write. It may help to write about things that are bothering you. This helps you find out how much stress you feel and what is causing it. When you know this, you can find better ways to cope.  What can you do to prevent stress?  You might try some of these things to help prevent stress:  Manage your time. This helps you find time to do the things you want and need to do.  Get enough sleep. Your body recovers from the stresses of the day while you are sleeping.  Get support. Your family, friends, and community can make a difference in how you experience stress.  Limit your news feed. Avoid or limit time on social media or news that may make you feel stressed.  Do something active. Exercise or activity can help reduce stress. Walking is a great way to get started.  Where can you learn more?  Go to https://www.[employer].net/patiented  Enter N032 in the search box to learn more about \"Learning About Stress.\"  Current as of: October 24, 2023  Content Version: 14.3    2024 [employer]. "   Care instructions adapted under license by your healthcare professional. If you have questions about a medical condition or this instruction, always ask your healthcare professional. [employer] / ?????.?? disclaims any warranty or liability for your use of this information.    Learning About Depression Screening  What is depression screening?  Depression screening is a way to see if you have depression symptoms. It may be done by a doctor or counselor. It's often part of a routine checkup. That's because your mental health is just as important as your physical health.  Depression is a mental health condition that affects how you feel, think, and act. You may:  Have less energy.  Lose interest in your daily activities.  Feel sad and grouchy for a long time.  Depression is very common. It affects people of all ages.  Many things can lead to depression. Some people become depressed after they have a stroke or find out they have a major illness like cancer or heart disease. The death of a loved one or a breakup may lead to depression. It can run in families. Most experts believe that a combination of inherited genes and stressful life events can cause it.  What happens during screening?  You may be asked to fill out a form about your depression symptoms. You and the doctor will discuss your answers. The doctor may ask you more questions to learn more about how you think, act, and feel.  What happens after screening?  If you have symptoms of depression, your doctor will talk to you about your options.  Doctors usually treat depression with medicines or counseling. Often, combining the two works best. Many people don't get help because they think that they'll get over the depression on their own. But people with depression may not get better unless they get treatment.  The cause of depression is not well understood. There may be many factors involved. But if you have depression, it's not your fault.  A serious symptom  "of depression is thinking about death or suicide. If you or someone you care about talks about this or about feeling hopeless, get help right away.  It's important to know that depression can be treated. Medicine, counseling, and self-care may help.  Where can you learn more?  Go to https://www.[employer].net/patiented  Enter T185 in the search box to learn more about \"Learning About Depression Screening.\"  Current as of: July 31, 2024  Content Version: 14.3    2024 [email or web address].   Care instructions adapted under license by your healthcare professional. If you have questions about a medical condition or this instruction, always ask your healthcare professional. [email or web address] disclaims any warranty or liability for your use of this information.    Substance Use Disorder: Care Instructions  Overview     You can improve your life and health by stopping your use of alcohol or drugs. When you don't drink or use drugs, you may feel and sleep better. You may get along better with your family, friends, and coworkers. There are medicines and programs that can help with substance use disorder.  How can you care for yourself at home?  Here are some ways to help you stay sober and prevent relapse.  If you have been given medicine to help keep you sober or reduce your cravings, be sure to take it exactly as prescribed.  Talk to your doctor about programs that can help you stop using drugs or drinking alcohol.  Do not keep alcohol or drugs in your home.  Plan ahead. Think about what you'll say if other people ask you to drink or use drugs. Try not to spend time with people who drink or use drugs.  Use the time and money spent on drinking or drugs to do something that's important to you.  Preventing a relapse  Have a plan to deal with relapse. Learn to recognize changes in your thinking that lead you to drink or use drugs. Get help before you start to drink or use drugs again.  Try to stay away from situations, " friends, or places that may lead you to drink or use drugs.  If you feel the need to drink alcohol or use drugs again, seek help right away. Call a trusted friend or family member. Some people get support from organizations such as Narcotics Anonymous or [employer] or from treatment facilities.  If you relapse, get help as soon as you can. Some people make a plan with another person that outlines what they want that person to do for them if they relapse. The plan usually includes how to handle the relapse and who to notify in case of relapse.  Don't give up. Remember that a relapse doesn't mean that you have failed. Use the experience to learn the triggers that lead you to drink or use drugs. Then quit again. Recovery is a lifelong process. Many people have several relapses before they are able to quit for good.  Follow-up care is a key part of your treatment and safety. Be sure to make and go to all appointments, and call your doctor if you are having problems. It's also a good idea to know your test results and keep a list of the medicines you take.  When should you call for help?   Call 298  anytime you think you may need emergency care. For example, call if you or someone else:    Has overdosed or has withdrawal signs. Be sure to tell the emergency workers that you are or someone else is using or trying to quit using drugs. Overdose or withdrawal signs may include:  Losing consciousness.  Seizure.  Seeing or hearing things that aren't there (hallucinations).     Is thinking or talking about suicide or harming others.   Where to get help 24 hours a day, 7 days a week   If you or someone you know talks about suicide, self-harm, a mental health crisis, a substance use crisis, or any other kind of emotional distress, get help right away. You can:    Call the Suicide and Crisis Lifeline at 872.     Call 1-774-728-TALK (1-639.164.8431).     Text HOME to 577208 to access the Crisis Text Line.   Consider saving  "these numbers in your phone.  Go to Remote for more information or to chat online.  Call your doctor now or seek immediate medical care if:    You are having withdrawal symptoms. These may include nausea or vomiting, sweating, shakiness, and anxiety.   Watch closely for changes in your health, and be sure to contact your doctor if:    You have a relapse.     You need more help or support to stop.   Where can you learn more?  Go to https://www.[employer].net/patiented  Enter H573 in the search box to learn more about \"Substance Use Disorder: Care Instructions.\"  Current as of: November 15, 2023  Content Version: 14.3    2024 [employer].   Care instructions adapted under license by your healthcare professional. If you have questions about a medical condition or this instruction, always ask your healthcare professional. [employer] disclaims any warranty or liability for your use of this information.    Safer Sex: Care Instructions  Overview  Safer sex is a way to reduce your risk of getting a sexually transmitted infection (STI). It can also help prevent pregnancy.  Several products can help you practice safer sex and reduce your chance of STIs. One of the best is a condom. There are internal and external condoms. You can use a special rubber sheet (dental dam) for protection during oral sex. Disposable gloves can keep your hands from touching blood, semen, or other body fluids that can carry infections.  Remember that birth control methods such as diaphragms, IUDs, foams, and birth control pills do not stop you from getting STIs.  Follow-up care is a key part of your treatment and safety. Be sure to make and go to all appointments, and call your doctor if you are having problems. It's also a good idea to know your test results and keep a list of the medicines you take.  How can you care for yourself at home?  Think about getting vaccinated to help prevent hepatitis A, hepatitis B, and " "human papillomavirus (HPV). They can be spread through sex.  Use a condom every time you have sex. Use an external condom, which goes on the penis. Or use an internal condom, which goes into the vagina or anus.  Make sure you use the right size external condom. A condom that's too small can break easily. A condom that's too big can slip off during sex.  Use a new condom each time you have sex. Be careful not to poke a hole in the condom when you open the wrapper.  Don't use an internal condom and an external condom at the same time.  Never use petroleum jelly (such as Vaseline), grease, hand lotion, baby oil, or anything with oil in it. These products can make holes in the condom.  After intercourse, hold the edge of the condom as you remove it. This will help keep semen from spilling out of the condom.  Do not have sex with anyone who has symptoms of an STI, such as sores on the genitals or mouth.  Do not drink a lot of alcohol or use drugs before sex.  Limit your sex partners. Sex with one partner who has sex only with you can reduce your risk of getting an STI.  Don't share sex toys. But if you do share them, use a condom and clean the sex toys between each use.  Talk to any partners before you have sex. Talk about what you feel comfortable with and whether you have any boundaries with sex. And find out if your partner or partners may be at risk for any STI. Keep in mind that a person may be able to spread an STI even if they do not have symptoms. You and any partners may want to get tested for STIs.  Where can you learn more?  Go to https://www.[employer].net/patiented  Enter B608 in the search box to learn more about \"Safer Sex: Care Instructions.\"  Current as of: April 30, 2024  Content Version: 14.3    2024 [employer].   Care instructions adapted under license by your healthcare professional. If you have questions about a medical condition or this instruction, always ask your healthcare " professional. [facility] [employer], [facility] disclaims any warranty or liability for your use of this information.

## 2025-01-20 NOTE — PROGRESS NOTES
Preventive Care Visit  [facility]  Jenna Gonzalez NP, Family Medicine  Jan 20, 2025    Assessment & Plan   24 year old, here for preventive care.    Has lump in inner thigh, though it was an ingrown hair but not better and getting a little bigger over 2 years    (Z11.3) Screening for STDs (sexually transmitted diseases)  (primary encounter diagnosis)  Comment:   Plan: Chlamydia trachomatis/Neisseria gonorrhoeae by         PCR- VAGINAL SELF-SWAB        Not currently sexually active , has Nexplanon in place with rare spotting    (Z11.4) Screening for HIV (human immunodeficiency virus)  Comment:   Plan: HIV Antigen Antibody Combo        agreeable    (Z11.59) Need for hepatitis C screening test  Comment:   Plan: Hepatitis C Screen Reflex to HCV RNA Quant and         Genotype        agreeable    (Z80.3) Family history of malignant neoplasm of breast  Comment:   Plan: Adult Oncology/Hematology  Referral            (Z80.0) Family history of colon cancer  Comment:   Plan: Adult Oncology/Hematology  Referral          She is worried about cancer and requests genetic testing, FH breast cancer (pat aunt) and colon cancer (Grandfather    (R59.1) Lymphadenopathy  Comment:   Plan: CBC with Platelets & Differential        Left groin 2 years        Mass in question is approx 1 cm diameter and firm and palpable in the inguinal chain left side. Evidence of scarring in the mons pubis likely from infected hair follicles. There is no scarring and no erythema in this area of the mass  Will set her up for pelvic US. She leaves to return to [place] in 2 days and care in [place] is out of network    (Z23) Need for vaccination  Comment:    Plan: HPV9 (GARDASIL 9), REVIEW OF HEALTH MAINTENANCE        PROTOCOL ORDERS, COVID-19 12+ (PFIZER),         INFLUENZA VACCINE,SPLIT         VIRUS,TRIVALENT,PF(FLUZONE)            (Z00.00) Routine general medical examination at a health care facility  Comment:   Plan:  "  Patient has been advised of split billing requirements and indicates understanding: Yes  Growth      Normal height and weight    Immunizations   Appropriate vaccinations were ordered.    Anticipatory Guidance    Reviewed age appropriate anticipatory guidance.       Referrals/Ongoing Specialty Care  Referrals made, see above    Depression Screening Follow Up        1/20/2025    12:24 PM   Last PHQ-9   1.  Little interest or pleasure in doing things 3   2.  Feeling down, depressed, or hopeless 2   3.  Trouble falling or staying asleep, or sleeping too much 3   4.  Feeling tired or having little energy 3   5.  Poor appetite or overeating 2   6.  Feeling bad about yourself 2   7.  Trouble concentrating 2   8.  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  Not sexually active at this time, has Nexplanon in place with rare bleeding  Has lost a little wt by being more active  Would like to catch up on vaccines.  Physical and Mass            1/20/2025    12:33 PM   Additional Questions   Accompanied by self           1/20/2025   Social   Lack of transportation has limited access to appts/meds No   Do you have housing? (Housing is defined as stable permanent housing and does not include staying ouside in a car, in a tent, in an abandoned building, in an overnight shelter, or couch-surfing.) Yes   Are you worried about losing your housing? No            1/20/2025   Diet   In past 12 months, concerned food might run out No   In past 12 months, food " has run out/couldn't afford more No        Objective     Exam      Physical Exam  Alert and oriented no acute distress  Ears nose throat with normal exams, neck supple no cervical lymphadenopathy no thyroid enlargement  Heart regular rate rhythm  Lungs clear  Abdomen soft nontender  Breast exam was done as she is worried about breast cancer and is normal with no infra or supra clavicular nodes palpable no masses throughout either breast pelvic exam was done to further explore the lump in the left groin.  It is described as above  Vault is with a little thick white discharge cervix is cleared and closed there is no cervical motion tenderness and no adnexal tenderness with bimanual exam      Signed Electronically by: Jenna Gonzalez NP

## 2025-01-21 ENCOUNTER — HOSPITAL ENCOUNTER (OUTPATIENT)
Dept: ULTRASOUND IMAGING | Facility: CLINIC | Age: 25
Discharge: HOME | End: 2025-01-21
Attending: NURSE PRACTITIONER
Payer: COMMERCIAL

## 2025-01-21 DIAGNOSIS — R59.1 LYMPHADENOPATHY: ICD-10-CM

## 2025-01-21 LAB
C TRACH DNA SPEC QL PROBE+SIG AMP: NEGATIVE
HCV AB SERPL QL IA: NONREACTIVE
HIV 1+2 AB+HIV1 P24 AG SERPL QL IA: NONREACTIVE
N GONORRHOEA DNA SPEC QL NAA+PROBE: NEGATIVE
SPECIMEN TYPE: NORMAL

## 2025-01-21 PROCEDURE — 76857 US EXAM PELVIC LIMITED: CPT

## 2025-02-17 ENCOUNTER — TELEPHONE (OUTPATIENT)
Dept: FAMILY MEDICINE | Facility: CLINIC | Age: 25
End: 2025-02-17

## 2025-02-17 ENCOUNTER — OFFICE VISIT (OUTPATIENT)
Dept: SURGERY | Facility: CLINIC | Age: 25
End: 2025-02-17
Attending: NURSE PRACTITIONER
Payer: COMMERCIAL

## 2025-02-17 VITALS
WEIGHT: 153.7 LBS | SYSTOLIC BLOOD PRESSURE: 110 MMHG | DIASTOLIC BLOOD PRESSURE: 68 MMHG | HEIGHT: 63 IN | BODY MASS INDEX: 27.23 KG/M2

## 2025-02-17 DIAGNOSIS — L72.3 SEBACEOUS CYST: ICD-10-CM

## 2025-02-17 PROCEDURE — 99243 OFF/OP CNSLTJ NEW/EST LOW 30: CPT | Performed by: SPECIALIST

## 2025-02-17 NOTE — PROGRESS NOTES
"[facility] Surgery Consult    HPI:    24 year old year old female who I have been consulted by Jenna Gonzalez for evaluation of Consult For (sebaceous cyst in  left inguinal area, US at [facility] 1/21-she would prefer removal rather than monitoring. )  I been asked to evaluate her for possible lesion here in her groin.  Left side noticed for some time    Allergies:Amoxicillin and Nickel    History reviewed. No pertinent past medical history.    History reviewed. No pertinent surgical history.    CURRENT MEDS:  Current Outpatient Medications   Medication Sig Dispense Refill    Cholecalciferol (VITAMIN D-3 PO) Take by mouth daily.      etonogestrel (NEXPLANON) 68 MG IMPL 1 each (68 mg) by Subdermal route once       No current facility-administered medications for this visit.       History reviewed. No pertinent family history.     reports that she has quit smoking. Her smoking use included cigarettes. She has never used smokeless tobacco. She reports current alcohol use.    Review of Systems:  Negative except left kind of groin labial fullness and firmness in the spot.    OBJECTIVE:  Vitals:    02/17/25 0924   BP: 110/68   Weight: 69.7 kg (153 lb 11.2 oz)   Height: 1.6 m (5' 3\")     Body mass index is 27.23 kg/m .    EXAM:  GENERAL: This is a well-developed 24 year old female who appears her stated age  HEAD: normocephalic  HEENT: Pupils equal and reactive bilaterally  MOUTH: mucus membranes intact  CARDIAC: RRR without murmur  CHEST/LUNG:  Clear to auscultation  ABDOMEN: Soft, nontender, nondistended, palpable nodes there is a 2-3 counterman area right her groin/labial area probably related to shaving this area  EXTREMITIES: Grossly normal, warm,   NEUROLOGIC: Focally intact, nonfocal  INTEGUMENT: No open lesions or ulcers  VASCULAR: Pulses intact, symmetrical upper and lower extremities.        LABS:  Lab Results   Component Value Date    WBC 6.0 01/20/2025    HGB 14.4 01/20/2025    HCT 41.8 01/20/2025    MCV 91 " 01/20/2025     01/20/2025         Assessment/Plan:   Reactive count of node enlarged a bit firm firm looks weak probably from shaving irritation actually she has count a bit quite a bit of ingrown hairs right now noticeable.  I discussed about excising her local MAC anesthesia same-day surgery setting she is not interested in doing that right now she we will arrange this in the future discussed and answered questions her today.      No follow-ups on file.    Gregg Vasquez MD  General Surgery [phone]  Vascular Surgery [phone]

## 2025-02-17 NOTE — LETTER
"2/17/2025      Claudia Wilkins  [address]  [facility] [address]      Dear Colleague,    Thank you for referring your patient, Claudia Wilkins, to the [facility] [place]. Please see a copy of my visit note below.        [facility] Surgery Consult    HPI:    24 year old year old female who I have been consulted by Jenna Gonzalez for evaluation of Consult For (sebaceous cyst in  left inguinal area, US at [facility] 1/21-she would prefer removal rather than monitoring. )  I been asked to evaluate her for possible lesion here in her groin.  Left side noticed for some time    Allergies:Amoxicillin and Nickel    History reviewed. No pertinent past medical history.    History reviewed. No pertinent surgical history.    CURRENT MEDS:  Current Outpatient Medications   Medication Sig Dispense Refill     Cholecalciferol (VITAMIN D-3 PO) Take by mouth daily.       etonogestrel (NEXPLANON) 68 MG IMPL 1 each (68 mg) by Subdermal route once       No current facility-administered medications for this visit.       History reviewed. No pertinent family history.     reports that she has quit smoking. Her smoking use included cigarettes. She has never used smokeless tobacco. She reports current alcohol use.    Review of Systems:  Negative except left kind of groin labial fullness and firmness in the spot.    OBJECTIVE:  Vitals:    02/17/25 0924   BP: 110/68   Weight: 69.7 kg (153 lb 11.2 oz)   Height: 1.6 m (5' 3\")     Body mass index is 27.23 kg/m .    EXAM:  GENERAL: This is a well-developed 24 year old female who appears her stated age  HEAD: normocephalic  HEENT: Pupils equal and reactive bilaterally  MOUTH: mucus membranes intact  CARDIAC: RRR without murmur  CHEST/LUNG:  Clear to auscultation  ABDOMEN: Soft, nontender, nondistended, palpable nodes there is a 2-3 counterman area right her groin/labial area probably related to shaving this area  EXTREMITIES: Grossly normal, warm, "   NEUROLOGIC: Focally intact, nonfocal  INTEGUMENT: No open lesions or ulcers  VASCULAR: Pulses intact, symmetrical upper and lower extremities.        LABS:  Lab Results   Component Value Date    WBC 6.0 01/20/2025    HGB 14.4 01/20/2025    HCT 41.8 01/20/2025    MCV 91 01/20/2025     01/20/2025         Assessment/Plan:   Reactive count of node enlarged a bit firm firm looks weak probably from shaving irritation actually she has count a bit quite a bit of ingrown hairs right now noticeable.  I discussed about excising her local MAC anesthesia same-day surgery setting she is not interested in doing that right now she we will arrange this in the future discussed and answered questions her today.      No follow-ups on file.    Gregg Vasquez MD  General Surgery [phone]  Vascular Surgery [phone]        Again, thank you for allowing me to participate in the care of your patient.        Sincerely,        Gregg Vasquez MD    Electronically signed

## 2025-02-17 NOTE — TELEPHONE ENCOUNTER
Order/Referral Request    Who is requesting: PATIENT     Orders being requested: LYMPHNODE REMOVAL     Reason service is needed/diagnosis: SWOLLEN LYMPHNODE    When are orders needed by: ASAP    Has this been discussed with Provider: Yes    Does patient have a preference on a Group/Provider/Facility? ANY     Does patient have an appointment scheduled?: No    Where to send orders: N/A    Could we send this information to you in [place] or would you prefer to receive a phone call?:   Patient would prefer a phone call   Okay to leave a detailed message?: Yes at Cell number on file:    Telephone Information:   Mobile [phone]

## 2025-02-17 NOTE — TELEPHONE ENCOUNTER
US showed this is likely a sebaceous cyst of location and size that the surgeon would have to remove if it is deemed necessary to remove. I would defer to the surgeon's recommendation.  The visit today I have no doubt was a consult with the surgeon

## 2025-03-04 DIAGNOSIS — B37.31 YEAST INFECTION OF THE VAGINA: Primary | ICD-10-CM

## 2025-03-04 RX ORDER — FLUCONAZOLE 150 MG/1
150 TABLET ORAL ONCE
Qty: 1 TABLET | Refills: 0 | Status: SHIPPED | OUTPATIENT
Start: 2025-03-04 | End: 2025-03-04